# Patient Record
Sex: MALE | HISPANIC OR LATINO | Employment: FULL TIME | URBAN - METROPOLITAN AREA
[De-identification: names, ages, dates, MRNs, and addresses within clinical notes are randomized per-mention and may not be internally consistent; named-entity substitution may affect disease eponyms.]

---

## 2023-01-02 PROCEDURE — 99284 EMERGENCY DEPT VISIT MOD MDM: CPT

## 2023-01-02 PROCEDURE — 250N000013 HC RX MED GY IP 250 OP 250 PS 637: Performed by: EMERGENCY MEDICINE

## 2023-01-02 RX ORDER — ACETAMINOPHEN 325 MG/1
650 TABLET ORAL ONCE
Status: COMPLETED | OUTPATIENT
Start: 2023-01-02 | End: 2023-01-02

## 2023-01-02 RX ORDER — IBUPROFEN 600 MG/1
600 TABLET, FILM COATED ORAL ONCE
Status: COMPLETED | OUTPATIENT
Start: 2023-01-02 | End: 2023-01-02

## 2023-01-02 RX ADMIN — ACETAMINOPHEN 650 MG: 325 TABLET, FILM COATED ORAL at 22:24

## 2023-01-02 RX ADMIN — IBUPROFEN 600 MG: 600 TABLET ORAL at 22:24

## 2023-01-03 ENCOUNTER — APPOINTMENT (OUTPATIENT)
Dept: GENERAL RADIOLOGY | Facility: CLINIC | Age: 43
End: 2023-01-03
Attending: EMERGENCY MEDICINE
Payer: OTHER MISCELLANEOUS

## 2023-01-03 ENCOUNTER — HOSPITAL ENCOUNTER (EMERGENCY)
Facility: CLINIC | Age: 43
Discharge: HOME | End: 2023-01-03
Attending: EMERGENCY MEDICINE | Admitting: EMERGENCY MEDICINE
Payer: OTHER MISCELLANEOUS

## 2023-01-03 VITALS
TEMPERATURE: 98.3 F | BODY MASS INDEX: 24.99 KG/M2 | SYSTOLIC BLOOD PRESSURE: 129 MMHG | DIASTOLIC BLOOD PRESSURE: 94 MMHG | OXYGEN SATURATION: 99 % | HEART RATE: 80 BPM | RESPIRATION RATE: 16 BRPM | HEIGHT: 65 IN | WEIGHT: 150 LBS

## 2023-01-03 DIAGNOSIS — S30.0XXA LUMBAR CONTUSION, INITIAL ENCOUNTER: ICD-10-CM
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
## 2023-01-03 DIAGNOSIS — S16.1XXA STRAIN OF NECK MUSCLE, INITIAL ENCOUNTER: Primary | ICD-10-CM

## 2023-01-03 PROCEDURE — 72040 X-RAY EXAM NECK SPINE 2-3 VW: CPT

## 2023-01-03 PROCEDURE — 72100 X-RAY EXAM L-S SPINE 2/3 VWS: CPT

## 2023-01-03 ASSESSMENT — ENCOUNTER SYMPTOMS
NECK PAIN: 1
BACK PAIN: 1
WEAKNESS: 0
NUMBNESS: 0

## 2023-01-03 ASSESSMENT — ACTIVITIES OF DAILY LIVING (ADL): ADLS_ACUITY_SCORE: 33

## 2023-01-03 NOTE — LETTER
January 3, 2023      To Whom It May Concern:      Seferino Hawleyierrez was seen in our Emergency Department today, 01/03/23.  Please excuse him from work 1/3-1/4.    Sincerely,        Nicholas Toledo MD

## 2023-01-03 NOTE — ED PROVIDER NOTES
"  History     Chief Complaint:  Fall     HPI   Seferino Coffman is a 42 year old male who presents with a fall. The patient fell at work outside due to ice. He has neck pain radiating to top of left shoulder, and low back pain. He had neck pain immediately after the fall with back pain, and the shoulder pain came later on. He denies loss of consciousness, but states he has pain at the back of his head.     Independent Historian: yes    Review of External Notes: Care Everywhere    ROS:  Review of Systems   Musculoskeletal: Positive for back pain and neck pain.   Neurological: Negative for weakness and numbness.   All other systems reviewed and are negative.    Allergies:  No Known Allergies     Medications:    Gabapentin    Past Medical History:    Pterygium    Past Surgical History:    Eye surgery    Social History:  Presents alone  Physical Exam     Patient Vitals for the past 24 hrs:   BP Temp Temp src Pulse Resp SpO2 Height Weight   01/03/23 0142 -- -- -- -- -- 99 % 1.651 m (5' 5\") 68 kg (150 lb)   01/03/23 0120 (!) 129/94 -- -- 80 -- 99 % -- --   01/02/23 1933 118/82 98.3  F (36.8  C) Oral 65 16 99 % -- --        Physical Exam  General: Appears well-developed and well-nourished.   Head: No signs of trauma.   Neck: Normal range of motion. Tenderness primarily to left side of posterior neck.  CV: Normal rate and regular rhythm.    Resp: Effort normal and breath sounds normal. No respiratory distress.   GI: Soft. There is no tenderness.  No rebound or guarding.    MSK: Tenderness across lower lumbar.  No point tenderness, no step offs.  Neuro: The patient is alert and oriented.  Strength in upper/lower extremities normal and symmetrical. Sensation normal. Speech normal. Ambulatory  GCS 15  Skin: Skin is warm and dry. No rash noted.   Psych: normal mood and affect. behavior is normal.       Emergency Department Course   Imaging:  Cervical spine XR, 2-3 views   Final Result   IMPRESSION: No fracture. Normal " vertebral heights and alignment. Normal disc spaces and facets for age. Normal extraspinal structures.            Lumbar spine XR, 2-3 views   Final Result   IMPRESSION: No fracture. Normal vertebral heights and alignment. Normal disc spaces and facets for age. Normal extraspinal structures.         Report per radiology      Emergency Department Course & Assessments:    Interventions:  Medications   ibuprofen (ADVIL/MOTRIN) tablet 600 mg (600 mg Oral Given 1/2/23 2224)   acetaminophen (TYLENOL) tablet 650 mg (650 mg Oral Given 1/2/23 2224)      Consultations/Discussion of Management or Tests:  0123 I obtained history and examined patient.    Disposition:  The patient was discharged to home.     Impression & Plan      Medical Decision Making:  Seferino Coffman is a 42-year-old gentleman who presents after a fall.  He states that he slipped on the ice while at work today.  Initially he felt okay and was able to finish working, but developed pain in the neck, primarily in the left side and in the low back.  On my evaluation he did have some midline tenderness, but there is no point tenderness.  There is no other external signs of trauma and the patient has been ambulatory.  X-rays of the cervical spine and lumbar spine were obtained and these did not show any signs of fracture.  I considered a CT scan, but given the reassuring exam and time since the injury, I did not feel that further imaging with the additional radiation was indicated.  Patient was discharged home with recommendation for supportive care and follow-up in clinic    Diagnosis:    ICD-10-CM    1. Strain of neck muscle, initial encounter  S16.1XXA       2. Fall due to slipping on ice or snow, initial encounter  W00.9XXA       3. Lumbar contusion, initial encounter  S30.0XXA            Discharge Medications:  New Prescriptions    No medications on file        1/3/2023   Nicholas Toledo MD     Scribe Disclosure:  Randi SANCHEZ Hired, am serving  as a scribe at 1:27 AM on 1/3/2023 to document services personally performed by Nicholas Toledo MD based on my observations and the provider's statements to me.           Nicholas Toledo MD  01/03/23 0259       Nicholas Toledo MD  01/03/23 0259       Nicholas Toledo MD  01/03/23 0259       Nicholas Toledo MD  01/03/23 0300

## 2023-01-03 NOTE — ED TRIAGE NOTES
Pt fell at work and landed backward. C/o low back pain, right sided neck pain, left shoulder pain and headache. Denies LOC and not on thinners. Denies numbness/tingling or loss bowel/bladder control. Happened 3 hours prior to arrival and has not taken anything.

## 2023-01-10 ENCOUNTER — HOSPITAL ENCOUNTER (EMERGENCY)
Facility: CLINIC | Age: 43
Discharge: HOME | End: 2023-01-11
Attending: EMERGENCY MEDICINE | Admitting: EMERGENCY MEDICINE
Payer: OTHER MISCELLANEOUS

## 2023-01-10 ENCOUNTER — APPOINTMENT (OUTPATIENT)
Dept: CT IMAGING | Facility: CLINIC | Age: 43
End: 2023-01-10
Attending: EMERGENCY MEDICINE
Payer: OTHER MISCELLANEOUS

## 2023-01-10 VITALS
TEMPERATURE: 98.5 F | HEIGHT: 65 IN | RESPIRATION RATE: 14 BRPM | HEART RATE: 89 BPM | WEIGHT: 130 LBS | BODY MASS INDEX: 21.66 KG/M2 | DIASTOLIC BLOOD PRESSURE: 58 MMHG | SYSTOLIC BLOOD PRESSURE: 129 MMHG | OXYGEN SATURATION: 98 %

## 2023-01-10 DIAGNOSIS — S06.0X0A CONCUSSION WITHOUT LOSS OF CONSCIOUSNESS, INITIAL ENCOUNTER: ICD-10-CM

## 2023-01-10 DIAGNOSIS — S30.0XXA CONTUSION OF LOWER BACK, INITIAL ENCOUNTER: ICD-10-CM

## 2023-01-10 DIAGNOSIS — R51.9 NONINTRACTABLE HEADACHE, UNSPECIFIED CHRONICITY PATTERN, UNSPECIFIED HEADACHE TYPE: ICD-10-CM

## 2023-01-10 DIAGNOSIS — S40.011A CONTUSION OF RIGHT SHOULDER, INITIAL ENCOUNTER: ICD-10-CM

## 2023-01-10 PROCEDURE — 250N000013 HC RX MED GY IP 250 OP 250 PS 637: Performed by: EMERGENCY MEDICINE

## 2023-01-10 PROCEDURE — 99284 EMERGENCY DEPT VISIT MOD MDM: CPT | Mod: 25

## 2023-01-10 PROCEDURE — 70450 CT HEAD/BRAIN W/O DYE: CPT

## 2023-01-10 RX ORDER — IBUPROFEN 600 MG/1
600 TABLET, FILM COATED ORAL ONCE
Status: COMPLETED | OUTPATIENT
Start: 2023-01-10 | End: 2023-01-10

## 2023-01-10 RX ADMIN — IBUPROFEN 600 MG: 600 TABLET ORAL at 23:25

## 2023-01-10 ASSESSMENT — ACTIVITIES OF DAILY LIVING (ADL): ADLS_ACUITY_SCORE: 33

## 2023-01-11 ASSESSMENT — ENCOUNTER SYMPTOMS
NAUSEA: 0
VOMITING: 0
HEADACHES: 1
ARTHRALGIAS: 1
DIZZINESS: 0
BACK PAIN: 1

## 2023-01-11 NOTE — ED TRIAGE NOTES
Pt had a fall 4 days ago. Lower back and head pain persisted. Pt is taking tylenol and ibuprofen without much relief.      Triage Assessment     Row Name 01/10/23 4073       Triage Assessment (Adult)    Airway WDL WDL       Respiratory WDL    Respiratory WDL WDL       Skin Circulation/Temperature WDL    Skin Circulation/Temperature WDL WDL       Cardiac WDL    Cardiac WDL WDL       Peripheral/Neurovascular WDL    Peripheral Neurovascular WDL WDL       Cognitive/Neuro/Behavioral WDL    Cognitive/Neuro/Behavioral WDL WDL

## 2023-01-11 NOTE — ED PROVIDER NOTES
History   Chief Complaint:  Headache    The history is provided by the patient and medical records.      Seferino Coffman is a 42 year old male who presents with a headache. The patient states he sustained a fall 4 days ago. He states he landed on his bottom, but he is not sure if he hit his head, however he had no loss of consciousness. He endorses residual posterior head pain since his fall. This is accompanied by residual low back pain near his tailbone and left shoulder pain. He has been taking Tylenol and ibuprofen without improvement of symptoms. He denies nausea or emesis. He has full range of motion of his left shoulder with mild pain. He denies dizziness. He denies chronic medical problems or daily medication use. He denies known medication allergies. He is right-hand dominant. He does not have PCP established.      Review of External Notes: I reviewed the patient's emergency department visit note from 01/03/2023.      Imaging from 01/03/2023  Cervical spine XR, 2-3 views   Final Result   IMPRESSION: No fracture. Normal vertebral heights and alignment. Normal disc spaces and facets for age. Normal extraspinal structures.       Lumbar spine XR, 2-3 views   Final Result   IMPRESSION: No fracture. Normal vertebral heights and alignment. Normal disc spaces and facets for age. Normal extraspinal structures.   Report per radiology.     Review of Systems   Gastrointestinal: Negative for nausea and vomiting.   Musculoskeletal: Positive for arthralgias (left shoulder) and back pain.   Neurological: Positive for headaches. Negative for dizziness and syncope.   All other systems reviewed and are negative.    Allergies:  The patient has no known drug allergies.     Medications:   The patient is not currently taking any prescribed medications.    Past Medical History:    Pterygium     Past Surgical History:    Eye surgery, left  Excision/transposition/pterygium with graft, left      Family History:   "  Mother: Diabetes Mellitus     Social History:  The patient was accompanied to the emergency department by his wife.  PCP: No Ref-Primary, Physician     Physical Exam     Patient Vitals for the past 24 hrs:   BP Temp Temp src Pulse Resp SpO2 Height Weight   01/10/23 2139 129/58 98.5  F (36.9  C) Oral 89 14 98 % 1.651 m (5' 5\") 59 kg (130 lb)      Physical Exam  Nursing note and vitals reviewed.  Constitutional:  Oriented to person, place, and time. Cooperative.   HENT:   Head:   Atraumatic.  Nose:    Nose normal.   Mouth/Throat:   Face mask in place.   Eyes:    Conjunctivae normal and EOM are normal.      Pupils are equal, round, and reactive to light.   Neck:    Trachea normal.   Cardiovascular:  Normal rate, regular rhythm, normal heart sounds and normal pulses. No murmur heard.  Pulmonary/Chest:  Effort normal and breath sounds normal.   Abdominal:   Soft. Normal appearance and bowel sounds are normal.      There is no tenderness.      There is no rebound and no CVA tenderness.   Musculoskeletal:  Left shoulder is normal in appearance with some reproducible tenderness to palpation over the posterior aspect of the shoulder.  Full range of motion of the left shoulder intact.  Some tenderness to palpation to the tailbone region.  Extremities otherwise atraumatic x 4.   Lymphadenopathy:  No cervical adenopathy.   Neurological:   Alert and oriented to person, place, and time. Normal strength.      No cranial nerve deficit or sensory deficit. GCS eye subscore is 4. GCS verbal subscore is 5. GCS motor subscore is 6.   Skin:    Skin is intact. No rash noted.   Psychiatric:   Normal mood and affect.    Emergency Department Course   Imaging:  CT Head w/o Contrast   Final Result   IMPRESSION:   1.  No acute intracranial process.      Report per radiology    Emergency Department Course & Assessments:     Interventions:  Medications   ibuprofen (ADVIL/MOTRIN) tablet 600 mg (600 mg Oral Given 1/10/23 5007)      Independent " Interpretation (X-rays, CTs, rhythm strip):  I reviewed the patient's head CT.      Consultations/Discussion of Management or Tests:  2310 I obtained history and performed an exam of the patient as documented above.   0016 I rechecked the patient and explained results. We discussed plan of care.     Disposition:  The patient was discharged to home.     Impression & Plan    Medical Decision Making:  This is a 42-year-old male came in for further evaluation of ongoing symptoms after a fall a few days ago.  His main complaint is a headache, but he also does have some ongoing left shoulder and tailbone pain.  I do not feel that he requires imaging of his left shoulder, as there are no external signs of trauma and he has full range of motion.  He already had x-rays of his lumbar spine, and therefore I do not feel he requires repeat imaging of his tailbone.  I felt it was reasonable to proceed with a CT scan of his head though, which was also his request due to his concerned about the ongoing headaches.  I indicated that I felt it would be unlikely that he would have an intracranial hemorrhage or skull fracture given his exam and history, but I proceeded with a CT scan nonetheless.  His CT scan is unremarkable, and I indicated he very well could have a concussion as a cause of his ongoing headaches.  I recommended he continue with ibuprofen or Tylenol as well as ice.  He should follow-up in the clinic I am providing to him if he is not improving in a timely fashion and return here with any concerns or worsening symptoms.    Diagnosis:    ICD-10-CM    1. Nonintractable headache, unspecified chronicity pattern, unspecified headache type  R51.9       2. Concussion without loss of consciousness, initial encounter  S06.0X0A       3. Contusion of lower back, initial encounter  S30.0XXA       4. Contusion of right shoulder, initial encounter  S40.011A           Scribe Disclosure:  I, Jessica Azevedo, am serving as a scribe at 10:35  PM on 1/10/2023 to document services personally performed by Matt Chaves MD based on my observations and the provider's statements to me.     Matt Chaves MD  01/11/23 0305